# Patient Record
Sex: MALE | Race: WHITE | NOT HISPANIC OR LATINO | RURAL
[De-identification: names, ages, dates, MRNs, and addresses within clinical notes are randomized per-mention and may not be internally consistent; named-entity substitution may affect disease eponyms.]

---

## 2024-03-01 ENCOUNTER — LAB (OUTPATIENT)
Dept: RURAL CLINIC 2 | Facility: CLINIC | Age: 79
End: 2024-03-01

## 2024-03-01 ENCOUNTER — OV NP (OUTPATIENT)
Dept: RURAL CLINIC 2 | Facility: CLINIC | Age: 79
End: 2024-03-01
Payer: MEDICARE

## 2024-03-01 VITALS
DIASTOLIC BLOOD PRESSURE: 84 MMHG | TEMPERATURE: 97.5 F | SYSTOLIC BLOOD PRESSURE: 132 MMHG | BODY MASS INDEX: 31.83 KG/M2 | HEART RATE: 63 BPM | HEIGHT: 72 IN | WEIGHT: 235 LBS

## 2024-03-01 DIAGNOSIS — K21.9 GASTROESOPHAGEAL REFLUX DISEASE WITHOUT ESOPHAGITIS: ICD-10-CM

## 2024-03-01 DIAGNOSIS — T17.308D CHOKING, SUBSEQUENT ENCOUNTER: ICD-10-CM

## 2024-03-01 DIAGNOSIS — R13.19 ESOPHAGEAL DYSPHAGIA: ICD-10-CM

## 2024-03-01 PROBLEM — 266435005: Status: ACTIVE | Noted: 2024-03-01

## 2024-03-01 PROBLEM — 249489001: Status: ACTIVE | Noted: 2024-03-01

## 2024-03-01 PROBLEM — 40890009: Status: ACTIVE | Noted: 2024-03-01

## 2024-03-01 PROCEDURE — 99203 OFFICE O/P NEW LOW 30 MIN: CPT | Performed by: NURSE PRACTITIONER

## 2024-03-01 RX ORDER — AMLODIPINE BESYLATE 5 MG/1
1 TABLET TABLET ORAL ONCE A DAY
Status: ACTIVE | COMMUNITY

## 2024-03-01 RX ORDER — METHOCARBAMOL 500 MG/1
1.5 TABLETS TABLET ORAL
Status: ACTIVE | COMMUNITY

## 2024-03-01 RX ORDER — TAMSULOSIN HYDROCHLORIDE 0.4 MG/1
1 CAPSULE CAPSULE ORAL ONCE A DAY
Status: ACTIVE | COMMUNITY

## 2024-03-01 RX ORDER — FERROUS SULFATE 325(65) MG
1 TABLET TABLET ORAL
Status: ACTIVE | COMMUNITY

## 2024-03-01 RX ORDER — QUETIAPINE 50 MG/1
1 TABLET AT BEDTIME TABLET, FILM COATED ORAL ONCE A DAY
Status: ACTIVE | COMMUNITY

## 2024-03-01 RX ORDER — TADALAFIL 5 MG/1
1 TABLET AS NEEDED TABLET, FILM COATED ORAL ONCE A DAY
Status: ACTIVE | COMMUNITY

## 2024-03-01 RX ORDER — ASPIRIN 81 MG/1
1 TABLET TABLET, CHEWABLE ORAL ONCE A DAY
Status: ACTIVE | COMMUNITY

## 2024-03-01 RX ORDER — SOLIFENACIN SUCCINATE 10 MG/1
1 TABLET TABLET, FILM COATED ORAL ONCE A DAY
Status: ACTIVE | COMMUNITY

## 2024-03-01 RX ORDER — LISINOPRIL 20 MG/1
1 TABLET TABLET ORAL ONCE A DAY
Status: ACTIVE | COMMUNITY

## 2024-03-01 RX ORDER — MELOXICAM 5 MG/1
1 CAPSULE CAPSULE ORAL ONCE A DAY
Status: ACTIVE | COMMUNITY

## 2024-03-01 RX ORDER — DONEPEZIL HYDROCHLORIDE 10 MG/1
1 TABLET AT BEDTIME TABLET, FILM COATED ORAL ONCE A DAY
Status: ACTIVE | COMMUNITY

## 2024-03-01 RX ORDER — LYSINE 500 MG
AS DIRECTED TABLET ORAL
Status: ACTIVE | COMMUNITY

## 2024-03-01 RX ORDER — PANTOPRAZOLE SODIUM 40 MG/1
1 TABLET TABLET, DELAYED RELEASE ORAL ONCE A DAY
Status: ACTIVE | COMMUNITY

## 2024-03-01 NOTE — HPI-ZZZTODAY'S VISIT
The patient is a 78-year-old gentleman accompanied by his spouse and self-referred for esophageal issues reporting difficulty swallowing for the past 3 to 4 years. He is upper GI diagnosed achalasia s/p Botox injection in December 2020 and effective up until 9 months ago. Recently he has been under the care of Dr. Patel and underwent an upper endoscopy this past October and again on February 8, 2024, with Botox injection to the LES.  His ability to swallow improved after the initial injection however there was no change after the second injection.  He had a recent x-ray of his esophagus due to worsening dysphagia with findings of probable achalasia with persistence of barium and food debris after 10 minutes.  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  He spoke with a surgeon in [place] for possible surgical intervention and was recommended to follow-up with gastroenterology for full workup.  The patient has not had an esophageal manometry study to confirm the diagnosis of achalasia in the past.  His current diet consists of soft foods and full liquids with intermittent choking.  No complaints of painful swallowing, nausea or vomiting.

## 2024-04-15 ENCOUNTER — ESOM (OUTPATIENT)
Dept: URBAN - METROPOLITAN AREA MEDICAL CENTER 28 | Facility: MEDICAL CENTER | Age: 79
End: 2024-04-15

## 2024-05-15 ENCOUNTER — TELEPHONE ENCOUNTER (OUTPATIENT)
Dept: URBAN - METROPOLITAN AREA CLINIC 92 | Facility: CLINIC | Age: 79
End: 2024-05-15

## 2024-05-21 ENCOUNTER — OFFICE VISIT (OUTPATIENT)
Dept: URBAN - METROPOLITAN AREA MEDICAL CENTER 28 | Facility: MEDICAL CENTER | Age: 79
End: 2024-05-21

## 2024-05-31 ENCOUNTER — TELEPHONE ENCOUNTER (OUTPATIENT)
Dept: URBAN - METROPOLITAN AREA CLINIC 6 | Facility: CLINIC | Age: 79
End: 2024-05-31

## 2024-06-06 ENCOUNTER — OFFICE VISIT (OUTPATIENT)
Dept: RURAL CLINIC 4 | Facility: CLINIC | Age: 79
End: 2024-06-06
Payer: MEDICARE

## 2024-06-06 ENCOUNTER — TELEPHONE ENCOUNTER (OUTPATIENT)
Dept: RURAL CLINIC 8 | Facility: CLINIC | Age: 79
End: 2024-06-06

## 2024-06-06 VITALS
BODY MASS INDEX: 31.15 KG/M2 | TEMPERATURE: 98 F | WEIGHT: 230 LBS | SYSTOLIC BLOOD PRESSURE: 92 MMHG | DIASTOLIC BLOOD PRESSURE: 68 MMHG | HEART RATE: 82 BPM | HEIGHT: 72 IN

## 2024-06-06 DIAGNOSIS — R13.19 ESOPHAGEAL DYSPHAGIA: ICD-10-CM

## 2024-06-06 DIAGNOSIS — K21.9 GASTROESOPHAGEAL REFLUX DISEASE WITHOUT ESOPHAGITIS: ICD-10-CM

## 2024-06-06 PROCEDURE — 99213 OFFICE O/P EST LOW 20 MIN: CPT | Performed by: NURSE PRACTITIONER

## 2024-06-06 RX ORDER — TADALAFIL 5 MG/1
1 TABLET AS NEEDED TABLET, FILM COATED ORAL ONCE A DAY
Status: DISCONTINUED | COMMUNITY

## 2024-06-06 RX ORDER — ASPIRIN 81 MG/1
1 TABLET TABLET, CHEWABLE ORAL ONCE A DAY
Status: ACTIVE | COMMUNITY

## 2024-06-06 RX ORDER — LISINOPRIL 20 MG/1
1 TABLET TABLET ORAL ONCE A DAY
Status: ACTIVE | COMMUNITY

## 2024-06-06 RX ORDER — TAMSULOSIN HYDROCHLORIDE 0.4 MG/1
1 CAPSULE CAPSULE ORAL ONCE A DAY
Status: ACTIVE | COMMUNITY

## 2024-06-06 RX ORDER — PANTOPRAZOLE SODIUM 40 MG/1
1 TABLET TABLET, DELAYED RELEASE ORAL ONCE A DAY
Status: ACTIVE | COMMUNITY

## 2024-06-06 RX ORDER — LYSINE 500 MG
AS DIRECTED TABLET ORAL
Status: DISCONTINUED | COMMUNITY

## 2024-06-06 RX ORDER — FERROUS SULFATE 325(65) MG
1 TABLET TABLET ORAL
Status: ACTIVE | COMMUNITY

## 2024-06-06 RX ORDER — SOLIFENACIN SUCCINATE 10 MG/1
1 TABLET TABLET, FILM COATED ORAL ONCE A DAY
Status: ACTIVE | COMMUNITY

## 2024-06-06 RX ORDER — METHOCARBAMOL 500 MG/1
1.5 TABLETS TABLET ORAL
Status: ACTIVE | COMMUNITY

## 2024-06-06 RX ORDER — MELOXICAM 5 MG/1
1 CAPSULE CAPSULE ORAL ONCE A DAY
Status: ACTIVE | COMMUNITY

## 2024-06-06 RX ORDER — DONEPEZIL HYDROCHLORIDE 10 MG/1
1 TABLET AT BEDTIME TABLET, FILM COATED ORAL ONCE A DAY
Status: ACTIVE | COMMUNITY

## 2024-06-06 RX ORDER — QUETIAPINE 50 MG/1
1 TABLET AT BEDTIME TABLET, FILM COATED ORAL ONCE A DAY
Status: ACTIVE | COMMUNITY

## 2024-06-06 RX ORDER — AMLODIPINE BESYLATE 5 MG/1
1 TABLET TABLET ORAL ONCE A DAY
Status: DISCONTINUED | COMMUNITY

## 2024-06-06 NOTE — HPI-ZZZTODAY'S VISIT
The patient is a 78-year-old gentleman accompanied by his spouse and self-referred for esophageal issues reporting difficulty swallowing for the past 3 to 4 years. He is upper GI diagnosed achalasia s/p Botox injection in December 2020 and effective up until 9 months ago. Recently he has been under the care of Dr. Patel and underwent an upper endoscopy this past October and again on February 8, 2024, with Botox injection to the LES.  His ability to swallow improved after the initial injection however there was no change after the second injection.  He had a recent x-ray of his esophagus due to worsening dysphagia with findings of probable achalasia with persistence of barium and food debris after 10 minutes.  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  He spoke with a surgeon in [place] for possible surgical intervention and was recommended to follow-up with gastroenterology for full workup.  The patient has not had an esophageal manometry study to confirm the diagnosis of achalasia in the past.  His current diet consists of soft foods and full liquids with intermittent choking.  No complaints of painful swallowing, nausea or vomiting. 06/06/2024 The patient returns for follow-up from an esophageal manometry study for esophageal dysphagia rule out achalasia and unfortunately the patient was not able to tolerate intubation of the esophageal manometry probe and the procedure was aborted.  He reports continued difficulty swallowing and for that reason he remains on soft foods and full liquids.

## 2024-06-10 ENCOUNTER — DASHBOARD ENCOUNTERS (OUTPATIENT)
Age: 79
End: 2024-06-10

## 2024-06-14 ENCOUNTER — OFFICE VISIT (OUTPATIENT)
Dept: RURAL CLINIC 8 | Facility: CLINIC | Age: 79
End: 2024-06-14
Payer: MEDICARE

## 2024-06-14 ENCOUNTER — LAB OUTSIDE AN ENCOUNTER (OUTPATIENT)
Dept: RURAL CLINIC 8 | Facility: CLINIC | Age: 79
End: 2024-06-14

## 2024-06-14 VITALS
SYSTOLIC BLOOD PRESSURE: 130 MMHG | HEIGHT: 72 IN | WEIGHT: 230 LBS | TEMPERATURE: 98 F | BODY MASS INDEX: 31.15 KG/M2 | HEART RATE: 67 BPM | DIASTOLIC BLOOD PRESSURE: 80 MMHG

## 2024-06-14 DIAGNOSIS — R93.3 ABNORMAL ESOPHAGRAM: ICD-10-CM

## 2024-06-14 DIAGNOSIS — R13.19 ESOPHAGEAL DYSPHAGIA: ICD-10-CM

## 2024-06-14 DIAGNOSIS — E78.49 OTHER HYPERLIPIDEMIA: ICD-10-CM

## 2024-06-14 DIAGNOSIS — K21.9 CHRONIC GERD: ICD-10-CM

## 2024-06-14 PROBLEM — 55822004: Status: ACTIVE | Noted: 2024-06-14

## 2024-06-14 PROBLEM — 235595009: Status: ACTIVE | Noted: 2024-06-14

## 2024-06-14 PROCEDURE — 99214 OFFICE O/P EST MOD 30 MIN: CPT | Performed by: INTERNAL MEDICINE

## 2024-06-14 RX ORDER — PANTOPRAZOLE SODIUM 40 MG/1
1 TABLET TABLET, DELAYED RELEASE ORAL ONCE A DAY
Status: ACTIVE | COMMUNITY

## 2024-06-14 RX ORDER — SOLIFENACIN SUCCINATE 10 MG/1
1 TABLET TABLET, FILM COATED ORAL ONCE A DAY
Status: ACTIVE | COMMUNITY

## 2024-06-14 RX ORDER — TAMSULOSIN HYDROCHLORIDE 0.4 MG/1
1 CAPSULE CAPSULE ORAL ONCE A DAY
Status: ACTIVE | COMMUNITY

## 2024-06-14 RX ORDER — FERROUS SULFATE 325(65) MG
1 TABLET TABLET ORAL
Status: ACTIVE | COMMUNITY

## 2024-06-14 RX ORDER — LISINOPRIL 10 MG/1
1 TABLET TABLET ORAL ONCE A DAY
Status: ACTIVE | COMMUNITY

## 2024-06-14 RX ORDER — METHOCARBAMOL 500 MG/1
1.5 TABLETS TABLET ORAL
Status: ACTIVE | COMMUNITY

## 2024-06-14 RX ORDER — MELOXICAM 5 MG/1
1 CAPSULE CAPSULE ORAL ONCE A DAY
Status: ACTIVE | COMMUNITY

## 2024-06-14 RX ORDER — DONEPEZIL HYDROCHLORIDE 10 MG/1
1 TABLET AT BEDTIME TABLET, FILM COATED ORAL ONCE A DAY
Status: ACTIVE | COMMUNITY

## 2024-06-14 RX ORDER — QUETIAPINE 50 MG/1
1 TABLET AT BEDTIME TABLET, FILM COATED ORAL ONCE A DAY
Status: ACTIVE | COMMUNITY

## 2024-06-14 RX ORDER — ASPIRIN 81 MG/1
1 TABLET TABLET, CHEWABLE ORAL ONCE A DAY
Status: ACTIVE | COMMUNITY

## 2024-06-14 NOTE — PHYSICAL EXAM CHEST:
Pt to the restroom with steady gait.       Priyanka Moon RN  01/25/23 2597 no lesions, no deformities, no traumatic injuries, no significant scars are present, chest wall non-tender, no masses present, breathing is unlabored without accessory muscle use,normal breath sounds

## 2024-06-14 NOTE — HPI-ZZZTODAY'S VISIT
The patient is a 78-year-old gentleman accompanied by his spouse and self-referred for esophageal issues reporting difficulty swallowing for the past 3 to 4 years. He is upper GI diagnosed achalasia s/p Botox injection in December 2020 and effective up until 9 months ago. Recently he has been under the care of Dr. Patel and underwent an upper endoscopy this past October and again on February 8, 2024, with Botox injection to the LES.  His ability to swallow improved after the initial injection however there was no change after the second injection.  He had a recent x-ray of his esophagus due to worsening dysphagia with findings of probable achalasia with persistence of barium and food debris after 10 minutes.  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  He spoke with a surgeon in [place] for possible surgical intervention and was recommended to follow-up with gastroenterology for full workup.  The patient has not had an esophageal manometry study to confirm the diagnosis of achalasia in the past.  His current diet consists of soft foods and full liquids with intermittent choking.  No complaints of painful swallowing, nausea or vomiting. 06/06/2024 The patient returns for follow-up from an esophageal manometry study for esophageal dysphagia rule out achalasia and unfortunately the patient was not able to tolerate intubation of the esophageal manometry probe and the procedure was aborted.  He reports continued difficulty swallowing and for that reason he remains on soft foods and full liquids. -  06/14/2024: The patient returns for swallowing problems. PT says that in the early 70s he was diagnosed with a hiatal hernia. he says that food has not been going down.

## 2024-06-25 ENCOUNTER — TELEPHONE ENCOUNTER (OUTPATIENT)
Dept: RURAL CLINIC 8 | Facility: CLINIC | Age: 79
End: 2024-06-25

## 2024-07-09 ENCOUNTER — OFFICE VISIT (OUTPATIENT)
Dept: URBAN - METROPOLITAN AREA MEDICAL CENTER 33 | Facility: MEDICAL CENTER | Age: 79
End: 2024-07-09
Payer: MEDICARE

## 2024-07-09 DIAGNOSIS — R13.19 CERVICAL DYSPHAGIA: ICD-10-CM

## 2024-07-09 DIAGNOSIS — K22.0 ACHALASIA: ICD-10-CM

## 2024-07-09 PROCEDURE — 91010 ESOPHAGUS MOTILITY STUDY: CPT | Performed by: INTERNAL MEDICINE

## 2024-07-16 ENCOUNTER — TELEPHONE ENCOUNTER (OUTPATIENT)
Dept: RURAL CLINIC 8 | Facility: CLINIC | Age: 79
End: 2024-07-16

## 2024-07-17 ENCOUNTER — TELEPHONE ENCOUNTER (OUTPATIENT)
Dept: URBAN - METROPOLITAN AREA CLINIC 82 | Facility: CLINIC | Age: 79
End: 2024-07-17

## 2024-07-26 ENCOUNTER — OFFICE VISIT (OUTPATIENT)
Dept: URBAN - METROPOLITAN AREA TELEHEALTH 2 | Facility: TELEHEALTH | Age: 79
End: 2024-07-26
Payer: MEDICARE

## 2024-07-26 DIAGNOSIS — K22.0 ACHALASIA, ESOPHAGEAL: ICD-10-CM

## 2024-07-26 PROCEDURE — 99443 PHONE E/M BY PHYS 21-30 MIN: CPT | Performed by: STUDENT IN AN ORGANIZED HEALTH CARE EDUCATION/TRAINING PROGRAM

## 2024-07-26 RX ORDER — FERROUS SULFATE 325(65) MG
1 TABLET TABLET ORAL
Status: ACTIVE | COMMUNITY

## 2024-07-26 RX ORDER — DONEPEZIL HYDROCHLORIDE 10 MG/1
1 TABLET AT BEDTIME TABLET, FILM COATED ORAL ONCE A DAY
Status: ACTIVE | COMMUNITY

## 2024-07-26 RX ORDER — LISINOPRIL 10 MG/1
1 TABLET TABLET ORAL ONCE A DAY
Status: ACTIVE | COMMUNITY

## 2024-07-26 RX ORDER — METHOCARBAMOL 500 MG/1
1.5 TABLETS TABLET ORAL
Status: ACTIVE | COMMUNITY

## 2024-07-26 RX ORDER — ASPIRIN 81 MG/1
1 TABLET TABLET, CHEWABLE ORAL ONCE A DAY
Status: ACTIVE | COMMUNITY

## 2024-07-26 RX ORDER — QUETIAPINE 50 MG/1
1 TABLET AT BEDTIME TABLET, FILM COATED ORAL ONCE A DAY
Status: ACTIVE | COMMUNITY

## 2024-07-26 RX ORDER — PANTOPRAZOLE SODIUM 40 MG/1
1 TABLET TABLET, DELAYED RELEASE ORAL ONCE A DAY
Status: ACTIVE | COMMUNITY

## 2024-07-26 RX ORDER — MELOXICAM 5 MG/1
1 CAPSULE CAPSULE ORAL ONCE A DAY
Status: ACTIVE | COMMUNITY

## 2024-07-26 RX ORDER — SOLIFENACIN SUCCINATE 10 MG/1
1 TABLET TABLET, FILM COATED ORAL ONCE A DAY
Status: ACTIVE | COMMUNITY

## 2024-07-26 RX ORDER — TAMSULOSIN HYDROCHLORIDE 0.4 MG/1
1 CAPSULE CAPSULE ORAL ONCE A DAY
Status: ACTIVE | COMMUNITY

## 2024-07-26 NOTE — HPI-TODAY'S VISIT:
This is a 79-year-old man presenting today for telemedicine encounter to discuss peroral endoscopic myotomy.  The patient has had trouble swallowing for the last 2 years diagnostic evaluation has included upper endoscopy with empiric dilations and Botox to the lower esophageal sphincter.  The patient reports that following Botox injections a few years ago that he felt well for 2 to 3 years however he reports that within the last year his symptoms have returned and repeat Botox injection was not efficacious.  The patient underwent high-resolution esophageal manometry revealing a peristalsis though IRP was not significantly elevated.  The patient was given the diagnosis of achalasia type I.

## 2024-07-28 ENCOUNTER — LAB OUTSIDE AN ENCOUNTER (OUTPATIENT)
Dept: URBAN - METROPOLITAN AREA TELEHEALTH 2 | Facility: TELEHEALTH | Age: 79
End: 2024-07-28

## 2024-07-28 PROBLEM — 45564002: Status: ACTIVE | Noted: 2024-07-28

## 2024-07-30 ENCOUNTER — TELEPHONE ENCOUNTER (OUTPATIENT)
Dept: URBAN - METROPOLITAN AREA CLINIC 82 | Facility: CLINIC | Age: 79
End: 2024-07-30

## 2024-08-29 ENCOUNTER — WEB ENCOUNTER (OUTPATIENT)
Dept: URBAN - METROPOLITAN AREA CLINIC 82 | Facility: CLINIC | Age: 79
End: 2024-08-29

## 2024-09-30 ENCOUNTER — OFFICE VISIT (OUTPATIENT)
Dept: URBAN - METROPOLITAN AREA MEDICAL CENTER 24 | Facility: MEDICAL CENTER | Age: 79
End: 2024-09-30

## 2024-09-30 RX ORDER — PANTOPRAZOLE SODIUM 40 MG/1
1 TABLET TABLET, DELAYED RELEASE ORAL ONCE A DAY
Status: ACTIVE | COMMUNITY

## 2024-09-30 RX ORDER — MELOXICAM 5 MG/1
1 CAPSULE CAPSULE ORAL ONCE A DAY
Status: ACTIVE | COMMUNITY

## 2024-09-30 RX ORDER — METHOCARBAMOL 500 MG/1
1.5 TABLETS TABLET ORAL
Status: ACTIVE | COMMUNITY

## 2024-09-30 RX ORDER — TAMSULOSIN HYDROCHLORIDE 0.4 MG/1
1 CAPSULE CAPSULE ORAL ONCE A DAY
Status: ACTIVE | COMMUNITY

## 2024-09-30 RX ORDER — ASPIRIN 81 MG/1
1 TABLET TABLET, CHEWABLE ORAL ONCE A DAY
Status: ACTIVE | COMMUNITY

## 2024-09-30 RX ORDER — DONEPEZIL HYDROCHLORIDE 10 MG/1
1 TABLET AT BEDTIME TABLET, FILM COATED ORAL ONCE A DAY
Status: ACTIVE | COMMUNITY

## 2024-09-30 RX ORDER — SOLIFENACIN SUCCINATE 10 MG/1
1 TABLET TABLET, FILM COATED ORAL ONCE A DAY
Status: ACTIVE | COMMUNITY

## 2024-09-30 RX ORDER — FERROUS SULFATE 325(65) MG
1 TABLET TABLET ORAL
Status: ACTIVE | COMMUNITY

## 2024-09-30 RX ORDER — QUETIAPINE 50 MG/1
1 TABLET AT BEDTIME TABLET, FILM COATED ORAL ONCE A DAY
Status: ACTIVE | COMMUNITY

## 2024-09-30 RX ORDER — LISINOPRIL 10 MG/1
1 TABLET TABLET ORAL ONCE A DAY
Status: ACTIVE | COMMUNITY

## 2024-10-01 ENCOUNTER — TELEPHONE ENCOUNTER (OUTPATIENT)
Dept: URBAN - METROPOLITAN AREA CLINIC 82 | Facility: CLINIC | Age: 79
End: 2024-10-01

## 2024-10-01 ENCOUNTER — LAB OUTSIDE AN ENCOUNTER (OUTPATIENT)
Dept: URBAN - METROPOLITAN AREA CLINIC 82 | Facility: CLINIC | Age: 79
End: 2024-10-01

## 2024-10-01 ENCOUNTER — WEB ENCOUNTER (OUTPATIENT)
Dept: URBAN - METROPOLITAN AREA CLINIC 82 | Facility: CLINIC | Age: 79
End: 2024-10-01

## 2024-10-16 ENCOUNTER — OFFICE VISIT (OUTPATIENT)
Dept: URBAN - METROPOLITAN AREA MEDICAL CENTER 24 | Facility: MEDICAL CENTER | Age: 79
End: 2024-10-16
Payer: COMMERCIAL

## 2024-10-16 DIAGNOSIS — K29.80 ACUTE DUODENITIS: ICD-10-CM

## 2024-10-16 DIAGNOSIS — K22.0 ABNORMAL LOWER ESOPHAGEAL SPHINCTER RELAXATION: ICD-10-CM

## 2024-10-16 PROCEDURE — ENCPM ENDO CRICOPHARYNGAL MYTMY: Performed by: STUDENT IN AN ORGANIZED HEALTH CARE EDUCATION/TRAINING PROGRAM

## 2024-10-16 RX ORDER — PANTOPRAZOLE SODIUM 40 MG/1
1 TABLET TABLET, DELAYED RELEASE ORAL ONCE A DAY
Status: ACTIVE | COMMUNITY

## 2024-10-16 RX ORDER — FERROUS SULFATE 325(65) MG
1 TABLET TABLET ORAL
Status: ACTIVE | COMMUNITY

## 2024-10-16 RX ORDER — LISINOPRIL 10 MG/1
1 TABLET TABLET ORAL ONCE A DAY
Status: ACTIVE | COMMUNITY

## 2024-10-16 RX ORDER — DONEPEZIL HYDROCHLORIDE 10 MG/1
1 TABLET AT BEDTIME TABLET, FILM COATED ORAL ONCE A DAY
Status: ACTIVE | COMMUNITY

## 2024-10-16 RX ORDER — MELOXICAM 5 MG/1
1 CAPSULE CAPSULE ORAL ONCE A DAY
Status: ACTIVE | COMMUNITY

## 2024-10-16 RX ORDER — QUETIAPINE 50 MG/1
1 TABLET AT BEDTIME TABLET, FILM COATED ORAL ONCE A DAY
Status: ACTIVE | COMMUNITY

## 2024-10-16 RX ORDER — ASPIRIN 81 MG/1
1 TABLET TABLET, CHEWABLE ORAL ONCE A DAY
Status: ACTIVE | COMMUNITY

## 2024-10-16 RX ORDER — SOLIFENACIN SUCCINATE 10 MG/1
1 TABLET TABLET, FILM COATED ORAL ONCE A DAY
Status: ACTIVE | COMMUNITY

## 2024-10-16 RX ORDER — TAMSULOSIN HYDROCHLORIDE 0.4 MG/1
1 CAPSULE CAPSULE ORAL ONCE A DAY
Status: ACTIVE | COMMUNITY

## 2024-10-16 RX ORDER — METHOCARBAMOL 500 MG/1
1.5 TABLETS TABLET ORAL
Status: ACTIVE | COMMUNITY

## 2024-10-17 ENCOUNTER — CLAIMS CREATED FROM THE CLAIM WINDOW (OUTPATIENT)
Dept: URBAN - METROPOLITAN AREA MEDICAL CENTER 24 | Facility: MEDICAL CENTER | Age: 79
End: 2024-10-17
Payer: COMMERCIAL

## 2024-10-17 DIAGNOSIS — K22.0 ACHALASIA: ICD-10-CM

## 2024-10-17 PROCEDURE — 99214 OFFICE O/P EST MOD 30 MIN: CPT | Performed by: INTERNAL MEDICINE

## 2024-10-17 PROCEDURE — 99232 SBSQ HOSP IP/OBS MODERATE 35: CPT | Performed by: INTERNAL MEDICINE

## 2024-10-30 ENCOUNTER — OFFICE VISIT (OUTPATIENT)
Dept: URBAN - METROPOLITAN AREA CLINIC 105 | Facility: CLINIC | Age: 79
End: 2024-10-30

## 2024-11-15 ENCOUNTER — OFFICE VISIT (OUTPATIENT)
Dept: RURAL CLINIC 2 | Facility: CLINIC | Age: 79
End: 2024-11-15
Payer: COMMERCIAL

## 2024-11-15 VITALS
TEMPERATURE: 97.7 F | HEART RATE: 73 BPM | DIASTOLIC BLOOD PRESSURE: 76 MMHG | HEIGHT: 72 IN | WEIGHT: 224.2 LBS | BODY MASS INDEX: 30.37 KG/M2 | SYSTOLIC BLOOD PRESSURE: 111 MMHG

## 2024-11-15 DIAGNOSIS — K22.0 ACHALASIA, ESOPHAGEAL: ICD-10-CM

## 2024-11-15 DIAGNOSIS — K21.9 CHRONIC GERD: ICD-10-CM

## 2024-11-15 DIAGNOSIS — E78.49 OTHER HYPERLIPIDEMIA: ICD-10-CM

## 2024-11-15 PROCEDURE — 99213 OFFICE O/P EST LOW 20 MIN: CPT | Performed by: NURSE PRACTITIONER

## 2024-11-15 RX ORDER — MELOXICAM 5 MG/1
1 CAPSULE CAPSULE ORAL ONCE A DAY
Status: ACTIVE | COMMUNITY

## 2024-11-15 RX ORDER — METHOCARBAMOL 500 MG/1
1.5 TABLETS TABLET ORAL
Status: ACTIVE | COMMUNITY

## 2024-11-15 RX ORDER — DONEPEZIL HYDROCHLORIDE 10 MG/1
1 TABLET AT BEDTIME TABLET, FILM COATED ORAL ONCE A DAY
Status: ACTIVE | COMMUNITY

## 2024-11-15 RX ORDER — TAMSULOSIN HYDROCHLORIDE 0.4 MG/1
1 CAPSULE CAPSULE ORAL ONCE A DAY
Status: ACTIVE | COMMUNITY

## 2024-11-15 RX ORDER — LISINOPRIL 10 MG/1
1 TABLET TABLET ORAL ONCE A DAY
Status: ACTIVE | COMMUNITY

## 2024-11-15 RX ORDER — SOLIFENACIN SUCCINATE 10 MG/1
1 TABLET TABLET, FILM COATED ORAL ONCE A DAY
Status: ACTIVE | COMMUNITY

## 2024-11-15 RX ORDER — QUETIAPINE 50 MG/1
1 TABLET AT BEDTIME TABLET, FILM COATED ORAL ONCE A DAY
Status: ACTIVE | COMMUNITY

## 2024-11-15 RX ORDER — ASPIRIN 81 MG/1
1 TABLET TABLET, CHEWABLE ORAL ONCE A DAY
Status: ACTIVE | COMMUNITY

## 2024-11-15 RX ORDER — PANTOPRAZOLE SODIUM 40 MG/1
1 TABLET TABLET, DELAYED RELEASE ORAL ONCE A DAY
Status: ACTIVE | COMMUNITY

## 2024-11-15 RX ORDER — FERROUS SULFATE 325(65) MG
1 TABLET TABLET ORAL
Status: ACTIVE | COMMUNITY

## 2024-11-15 NOTE — HPI-TODAY'S VISIT:
This is a 79-year-old man presenting today for telemedicine encounter to discuss peroral endoscopic myotomy.  The patient has had trouble swallowing for the last 2 years diagnostic evaluation has included upper endoscopy with empiric dilations and Botox to the lower esophageal sphincter.  The patient reports that following Botox injections a few years ago that he felt well for 2 to 3 years however he reports that within the last year his symptoms have returned and repeat Botox injection was not efficacious.  The patient underwent high-resolution esophageal manometry revealing a peristalsis though IRP was not significantly elevated.  The patient was given the diagnosis of achalasia type I. 11/15/2024 He is f/u after undergoing POEM procedure for achalasia and is tolerating a regular diet. He has a little trouble with red meat and some chicken, and he otherwise has had no difficultly swallowing. He continues on Pantoprazole once daily.   Of note initial POEM procedure aborted d/t gastric ulcers. Ulcers treated, and POEM procedure completed.